# Patient Record
(demographics unavailable — no encounter records)

---

## 2017-01-01 NOTE — HHI.PCNN
Subjective


Note Status:  Progress Note


History of Present Illness


39 weeks [AGA] male born  at 0519 (ROM  @ 0434 -meconium-stained) via [

]. Prenatal complications:[none]. Delivery complications: Nuchal cord 1. 

APGARs 8/9. Feeding: [breast]. HepB:[neg]. GBS: Positive, inadequately treated 

with one incomplete dose. Mom/Baby/Fred: A -/A -/neg. Birth wt: 3235g.


Interval History


No acute issues overnight. Vitals are stable, patient remains afebrile.  5 

voids and 2 BM in the last 24 hours. Birth weight 3235g, today's weight 3080g, 

a 4.7% decrease.  Patient is tolerating breast feeds. Parents have no concerns 

today.


 (Jocelin Cisneros MD, R3)





Objective


Patient Weight


3080 g


 


 (Jocelin Cisneros MD, R3)





 Exam


General Appearance:  Appropriate for Gestational Age


Skin:  Normal (nevus simplex)


Jaundice:  No


Head:  Normal


Eyes Red Reflex:  Normal


Ears, Nose & Throat:  Normal (Britta pearls)


Thorax:  Normal


Lungs:  Normal


Heart:  Normal


Peripheral Pulses:  Normal


Abdomen:  Normal


Genitals:  Normal


Trunk and Spine:  Normal (sacral dimple <2.5cm from anal verge)


Extremities:  Normal


Clavicles:  Normal


Hips:  Stable


Anus:  Normal


 (Jocelin Cisneros MD, R3)





Impression


Impression & Plans


39 weeks gestation, Apgar 8/9, stable condition.  Physical exam benign


Respiratory: stable, no distress


FEN: encourage breast q2-3 hours as tolerated, monitor I&Os


ID: stable, GBS positive mother treated with penicillin less than 4 hours prior 

to delivery. Continue to monitor.


Heme: TcB 7.1 at 24 hours. Continue to monitor clinically.


Social: infant's condition and plans as above reviewed and discussed with 

parents who agreed with the plans and voiced understanding


Dispo: Anticipate discharge home tomorrow.





dw Dr. Cortes and Dr. Loya R1


 (Jocelin Cisneros MD, R3)


Condition on Discharge


Patient examined and case discussed with resident physicians


I have read the above note and agree with the assessment/plan as discussed with 

me


I was involved in all medical decision making for this patient





Art Cortes M.D.


 (Art Cortes MD)











Jocelin Cisneros MD, R3 Dec 5, 2017 09:50


Art Cortes MD Dec 5, 2017 15:05

## 2017-01-01 NOTE — PD.NUR.DAT
__________________________________________________





Physical Exam - Admission


Physical Exam:  General Appearance: AGA, Hips: Stable, No Jaundice


Normal: Skin (nevus simplex of the upper eyelids), Head, Equal Eyes Red Reflex, 

E.N.T. (Britta's pearls soft palate), Thorax, Equal Breath Sounds Lungs, Heart

, Equal Peripheral Pulses, Abdomen, Genitals (bilateral hydrocele), Trunk and 

Spine (sacral dimple less than 2.5 cm from anal verge), Extremities, Clavicles, 

Anus


Impression:


39 weeks gestation, Apgar 8/9, stable condition.  Physical exam benign


Respiratory: stable, no distress


FEN: encourage breast/formula as tolerated, monitor I&Os


ID: stable, GBS positive mother treated with penicillin less than 4 hours prior 

to delivery; if baby becomes symptomatic get CBC, CRP, and blood cultures


Social: infant's condition and plans as above reviewed and discussed with 

parents who agreed with the plans and voiced understanding


Admission Exam:  Dec 4, 2017


Examined by:


Patient was examined with Dr. John Loya and Dr. Jocelin Cisneros.


Case reviewed and discussed with the resident team


I was present for the entire history, physical, and medical decision making.





Maternal/Delivery/Infant Info


Maternal Information


Weeks Gestation:  39


Antepartum Risk Factors:  GBS Positive


Maternal Risk Factors Other:  none


Maternal Hepatitis B:  Negative


Maternal VDRL:  Negative


Maternal Gonorrhea:  Negative


Maternal Herpes:  Unknown


Maternal Chlamydia:  Negative


Maternal Group B Strep:  Positive


Maternal HIV:  Negative


Other Maternal Labs:  


Rubella Immune





Delivery Information


Delivery Provider:  Dr. Flanagan


Maternal Blood Type:  A


Maternal Rh Type:  Negative


Birth Complications:  Cord Around Neck


Birth Complications Other:  loose cord around the neck x1


Delivery Type:  Spontaneous


Other Indications:  none


Medications Given During Labor:  


Pen G 5000milliunits


ROM Date:  Dec 4, 2017


ROM Time:  434





Infant Information


Delivery Date:  Dec 4, 2017


Delivery Time:  05


Gestational Size:  AGA


Weight (Kilograms):  3.235


Height (Centimeters):  48.0


 Head Circumference:  33.0


[place] Chest Circumference:  30.00


Planned Feeding:  Breast Milk


Pediatrician:  service here and Dr. Foster after discharge





Administered Medications








 Medications  Dose


 Ordered  Sig/Princess  Start Time


 Stop Time Status Last Admin


 


 Phytonadione  1 mg  ONCE  ONCE  17 07:00


 17 07:01 DC 17 05:30


 


 


 Erythromycin  1


 application  ONCE  ONCE  17 07:00


 17 07:01 DC 17 05:30


 

















Danie Heller MD Dec 4, 2017 07:24

## 2017-01-01 NOTE — PD.NUR.DAT
__________________________________________________


 (John Loya MD R1)





Physical Exam - Admission


Impression:


39 weeks gestation, Apgar 8/9, stable condition.  Physical exam benign


Respiratory: stable, no distress


FEN: encourage breast/formula as tolerated, monitor I&Os


ID: stable, GBS positive mother treated with penicillin less than 4 hours prior 

to delivery; if baby becomes symptomatic get CBC, CRP, and blood cultures


Social: infant's condition and plans as above reviewed and discussed with 

parents who agreed with the plans and voiced understanding





Physical Exam:  General Appearance: AGA, Hips: Stable, No Jaundice


Normal: Skin (nevus simplex of the upper eyelids), Head, Equal Eyes Red Reflex, 

E.N.T. (Britta's pearls soft palate), Thorax, Equal Breath Sounds Lungs, Heart

, Equal Peripheral Pulses, Abdomen, Genitals (bilateral hydrocele), Trunk and 

Spine (sacral dimple less than 2.5 cm from anal verge), Extremities, Clavicles, 

Anus


Admission Exam:  Dec 4, 2017


Examined by:


Dr. Recinos and Dr. Loya


 (John Loya MD R1)





Physical Exam - Discharge


Impression:


39 weeks gestation, Apgar 8/9, stable condition.  Physical exam benign


Respiratory: stable, no distress


FEN: encourage breast/formula as tolerated, monitor I&Os


ID: stable, GBS positive mother treated with penicillin less than 4 hours prior 

to delivery; if baby becomes symptomatic get CBC, CRP, and blood cultures


Social: infant's condition and plans as above reviewed and discussed with 

parents who agreed with the plans and voiced understanding


MSK: R clavicle felt slightly abnormal with edema, questionable step off. 

Cannot rule out fracture. Discussed with parents the benefits/risks of an X ray 

and it was decided that no further imaging was warranted at this time. Parents 

educated that a hard nodule could develop and would represent bone healing. 

Instructed not to put pressure on the R shoulder while breast feeding. 

Instructed to inform pediatrician at next visit and to monitor for now. 





Physical Exam:  General Appearance: AGA, Hips: Stable, No Jaundice


Normal: Skin (Erythema Toxicum on the torso), Head, Equal Eyes Red Reflex, 

E.N.T., Thorax, Equal Breath Sounds Lungs, Heart, Equal Peripheral Pulses, 

Abdomen, Genitals (bilateral hydrocele), Trunk and Spine, Extremities, 

Clavicles (R clavicle felt slightly edematous, questionable notch/step off. No 

crepitus, pain with palpation or obvious deformity), Anus


Discharge Exam:  Dec 6, 2017


Examined by:


Dr. Recinos and Dr. Loya


Condition on Discharge:


Good


 (John Loya MD R1)





Maternal/Delivery/Infant Info


Maternal Information


Weeks Gestation:  39


Antepartum Risk Factors:  GBS Positive


Maternal Risk Factors Other:  none


Maternal Hepatitis B:  Negative


Maternal VDRL:  Negative


Maternal Gonorrhea:  Negative


Maternal Herpes:  Unknown


Maternal Chlamydia:  Negative


Maternal Group B Strep:  Positive


Maternal HIV:  Negative


Other Maternal Labs:  


Rubella Immune


 (John Loya MD R1)





Delivery Information


Delivery Provider:  Dr. Flanagan


Maternal Blood Type:  A


Maternal Rh Type:  Negative


Birth Complications:  Cord Around Neck


Birth Complications Other:  loose cord around the neck x1


Delivery Type:  Spontaneous


Other Indications:  none


Medications Given During Labor:  


Pen G 5000milliunits


ROM Date:  Dec 4, 2017


ROM Time:  0434


 (John Loya MD R1)





Infant Information


Delivery Date:  Dec 4, 2017


Delivery Time:  05


Gestational Size:  AGA


Weight (Kilograms):  3.030


Height (Centimeters):  48.0


[place] Head Circumference:  33.0


[place] Chest Circumference:  30.00


Planned Feeding:  Breast Milk


Pediatrician:  service here and Dr. Foster after discharge





Administered Medications








 Medications  Dose


 Ordered  Sig/Princess  Start Time


 Stop Time Status Last Admin


 


 Phytonadione  1 mg  ONCE  ONCE  17 07:00


 17 07:01 DC 17 05:30


 


 


 Erythromycin  1


 application  ONCE  ONCE  17 07:00


 17 07:01 DC 17 05:30


 


 


 [place]/


 Gentian Viol/


 Proflavine  1 ea  ONCE  ONCE  17 07:00


 17 07:01 DC 17 13:15


 


 


 Hepatitis B


 Vaccine  10 mcg  ONCE ONCE  17 09:00


 17 09:01 DC 17 05:36


 


 


 Lidocaine HCl  5 ml  UNSCH X1  PRN  17 13:45


 17 12:21 DC 17 13:46


 


 


 Microfibriller


 Collagen Hemostat  1 bandage  UNSCH X1  PRN  17 13:45


 17 12:21 DC 17 14:56


 








 (John Loya MD R1)


Lab - last results


Patient was examined with Dr. John Loya and Dr. Jocelin Cisneros.


Baby moving all 4 extremities specially moving both upper extremities well with 

spontaneous movement of all fingers.  Baby moving both wrists, elbows and 

shoulders symmetrically.  Symmetrical [place] reflex.





Case reviewed and discussed with the resident team.


Agree with plan of care as discussed with me and documented in the resident 

note.


I spent more than 30 minutes with the patient and the family to


-    Perform the final examination of the patient, 


-   Review and discuss the hospital stay, 


-   Coordinate and instruct ongoing care with caregivers, 


-   Prepare the final discharge records, prescriptions, and referral forms.


 (Danie Heller MD)











John Loya MD R1 Dec 6, 2017 14:20


Danie Heller MD Dec 6, 2017 17:27

## 2017-01-01 NOTE — HHI.DCPOC
Discharge Care Plan


Diagnosis:  


(1) 


Call your Pediatrician if


* Excessive somnolence (sleepiness) and difficult to arouse


* Excessive irritability and difficult to console


* Rectal temperature greater than or equal to 100.4


* Rectal temperature less than or equal to 97


* No bowel movement for more than 24 hours


Goals to Promote Your Health


* To maintain your infant's health at optimal level


* To prevent worsening of your infant's condition 


* To prevent complications for your infant


Directions to Meet Your Goals


*** Give your infant's medications as prescribed


*** Feed your infant every 2-4 hours


*** Follow activity as directed for your infant


*** Do not shake your infant


*** Maintain neck support


*** Do not sleep in bed with your infant


*** Keep your infant away from second hand smoke





*** Keep your infant's appointments as scheduled


*** Keep your infant's immunizations and boosters up to date


*** If symptoms worsen call your infant's PCP/Pediatrician; if no PCP/

Pediatrician go to Urgent Care Center or Emergency Room ***


***Call the 24-hour crisis hotline for domestic abuse at 1-673.472.4607***











Jocelin Cisneros MD, R3 Dec 6, 2017 08:04